# Patient Record
Sex: MALE | Race: BLACK OR AFRICAN AMERICAN | URBAN - METROPOLITAN AREA
[De-identification: names, ages, dates, MRNs, and addresses within clinical notes are randomized per-mention and may not be internally consistent; named-entity substitution may affect disease eponyms.]

---

## 2023-11-19 ENCOUNTER — HOSPITAL ENCOUNTER (EMERGENCY)
Age: 33
Discharge: HOME | End: 2023-11-19

## 2023-11-19 VITALS
HEART RATE: 88 BPM | RESPIRATION RATE: 18 BRPM | TEMPERATURE: 98 F | OXYGEN SATURATION: 97 % | SYSTOLIC BLOOD PRESSURE: 122 MMHG | DIASTOLIC BLOOD PRESSURE: 78 MMHG | WEIGHT: 144.4 LBS

## 2023-11-19 DIAGNOSIS — N34.2 URETHRITIS: Primary | ICD-10-CM

## 2023-11-19 DIAGNOSIS — Z20.2 POSSIBLE EXPOSURE TO STD: ICD-10-CM

## 2023-11-19 LAB
BACTERIA URNS QL MICRO: ABNORMAL /HPF
BILIRUB UR QL STRIP.AUTO: NEGATIVE
CLARITY UR: ABNORMAL
COLOR UR: YELLOW
EPI CELLS #/AREA URNS AUTO: 0 /HPF (ref 0–5)
GLUCOSE UR STRIP.AUTO-MCNC: NEGATIVE MG/DL
HGB UR QL STRIP.AUTO: NEGATIVE
HYALINE CASTS #/AREA URNS AUTO: 1 /LPF (ref 0–8)
KETONES UR STRIP.AUTO-MCNC: ABNORMAL MG/DL
LEUKOCYTE ESTERASE UR QL STRIP.AUTO: NEGATIVE
NITRITE UR QL STRIP.AUTO: NEGATIVE
PH UR STRIP.AUTO: 8 [PH] (ref 5–8)
PROT UR STRIP.AUTO-MCNC: ABNORMAL MG/DL
RBC CLUMPS #/AREA URNS AUTO: 0 /HPF (ref 0–4)
SP GR UR STRIP.AUTO: 1.02 (ref 1–1.03)
SPECIMEN TYPE: NORMAL
TRICHOMONAS VAGINALIS SCREEN: NEGATIVE
UA COMPLETE W REFLEX CULTURE PNL UR: ABNORMAL
UA DIPSTICK W REFLEX MICRO PNL UR: YES
URN SPEC COLLECT METH UR: ABNORMAL
UROBILINOGEN UR STRIP-ACNC: 1 E.U./DL
WBC #/AREA URNS AUTO: 7 /HPF (ref 0–5)

## 2023-11-19 PROCEDURE — 87491 CHLMYD TRACH DNA AMP PROBE: CPT

## 2023-11-19 PROCEDURE — 87808 TRICHOMONAS ASSAY W/OPTIC: CPT

## 2023-11-19 PROCEDURE — 96372 THER/PROPH/DIAG INJ SC/IM: CPT

## 2023-11-19 PROCEDURE — 81001 URINALYSIS AUTO W/SCOPE: CPT

## 2023-11-19 PROCEDURE — 6360000002 HC RX W HCPCS: Performed by: PHYSICIAN ASSISTANT

## 2023-11-19 PROCEDURE — 99284 EMERGENCY DEPT VISIT MOD MDM: CPT

## 2023-11-19 PROCEDURE — 87591 N.GONORRHOEAE DNA AMP PROB: CPT

## 2023-11-19 RX ORDER — CEFTRIAXONE 500 MG/1
500 INJECTION, POWDER, FOR SOLUTION INTRAMUSCULAR; INTRAVENOUS ONCE
Status: COMPLETED | OUTPATIENT
Start: 2023-11-19 | End: 2023-11-19

## 2023-11-19 RX ORDER — DOXYCYCLINE 100 MG/1
100 CAPSULE ORAL 2 TIMES DAILY
Qty: 14 CAPSULE | Refills: 0 | Status: SHIPPED | OUTPATIENT
Start: 2023-11-19 | End: 2023-11-26

## 2023-11-19 RX ADMIN — CEFTRIAXONE SODIUM 500 MG: 500 INJECTION, POWDER, FOR SOLUTION INTRAMUSCULAR; INTRAVENOUS at 14:14

## 2023-11-19 ASSESSMENT — PAIN - FUNCTIONAL ASSESSMENT: PAIN_FUNCTIONAL_ASSESSMENT: NONE - DENIES PAIN

## 2023-11-19 NOTE — ED NOTES
AVS reviewed, no needs or questions at this time. Pt discharged.       Russell [place]  76/30/76 [id number]

## 2023-11-19 NOTE — DISCHARGE INSTRUCTIONS
should be informed, tested, and treated for all STDs. Take your antibiotics as directed. Finish them even if you start to feel better. Only take over-the-counter or prescription medicines for pain, discomfort, or fever as directed by your caregiver. Rest.  Eat a balanced diet and drink enough fluids to keep your urine clear or pale yellow. Do not have sex until treatment is completed and you have followed up with your caregiver. STDs should be checked after treatment. Keep all follow-up appointments, Pap tests, and blood tests as directed by your caregiver. Only use latex condoms and water-soluble lubricants during sexual activity. Do not use petroleum jelly or oils. Avoid alcohol and illegal drugs. Get vaccinated for HPV and hepatitis. If you have not received these vaccines in the past, talk to your caregiver about whether one or both might be right for you. Avoid risky sex practices that can break the skin. The only way to avoid getting an STD is to avoid all sexual activity. Latex condoms and dental dams (for oral sex) will help lessen the risk of getting an STD, but will not completely eliminate the risk. SEEK MEDICAL CARE IF:   You have a fever. You have any new or worsening symptoms. Document Released: 03/09/2004 Document Revised: 03/11/2013 Document Reviewed: 03/16/2012  [facility] Patient Information [address] [place] [address]. Where can I get more information? Division of STD Prevention (DSTDP)  Centers for Disease Control and Prevention  www.cdc.gov/std  [place]  3-366-TLW-INFO (1-189-100--785-1777)  TTY: [phone]  [address] [place] [address] (NPIN)   [address] [place] [address]  [phone] Fax  [phone] [facility]  E-mail: [email or web address]    [employer](YUNIOR)   [address]  [phone]

## 2023-11-20 LAB
C TRACH DNA UR QL NAA+PROBE: NEGATIVE
N GONORRHOEA DNA UR QL NAA+PROBE: NEGATIVE